# Patient Record
Sex: MALE | Race: OTHER | HISPANIC OR LATINO | Employment: FULL TIME | URBAN - METROPOLITAN AREA
[De-identification: names, ages, dates, MRNs, and addresses within clinical notes are randomized per-mention and may not be internally consistent; named-entity substitution may affect disease eponyms.]

---

## 2018-07-21 ENCOUNTER — HOSPITAL ENCOUNTER (EMERGENCY)
Facility: HOSPITAL | Age: 28
Discharge: HOME | End: 2018-07-21
Attending: EMERGENCY MEDICINE
Payer: COMMERCIAL

## 2018-07-21 VITALS
RESPIRATION RATE: 16 BRPM | SYSTOLIC BLOOD PRESSURE: 156 MMHG | DIASTOLIC BLOOD PRESSURE: 77 MMHG | OXYGEN SATURATION: 96 % | TEMPERATURE: 98.7 F | HEART RATE: 104 BPM | WEIGHT: 250 LBS

## 2018-07-21 DIAGNOSIS — K08.89 PAIN, DENTAL: Primary | ICD-10-CM

## 2018-07-21 PROCEDURE — 99282 EMERGENCY DEPT VISIT SF MDM: CPT

## 2018-07-21 RX ORDER — HYDROCODONE BITARTRATE AND ACETAMINOPHEN 5; 325 MG/1; MG/1
1 TABLET ORAL EVERY 6 HOURS PRN
Qty: 10 TABLET | Refills: 0 | Status: SHIPPED | OUTPATIENT
Start: 2018-07-21 | End: 2018-09-04

## 2018-07-21 NOTE — DISCHARGE INSTRUCTIONS
Toothache   WHAT YOU NEED TO KNOW:   A toothache is pain that is caused by irritation of the nerves in the center of your tooth  The irritation may be caused by several problems, such as a cavity, an infection, a cracked tooth, or gum disease  It is very important to follow up with your dentist so the cause of your toothache can be diagnosed and treated  This can help prevent more serious problems  DISCHARGE INSTRUCTIONS:   Medicines: You may  need any of the following:  · NSAIDs  decrease swelling and pain  This medicine can be bought with or without a doctor's order  This medicine can cause stomach bleeding or kidney problems in certain people  If you take blood thinner medicine, always ask your healthcare provider if NSAIDs are safe for you  Always read the medicine label and follow the directions on it before using this medicine  · Acetaminophen  decreases pain  It is available without a doctor's order  Ask how much to take and how often to take it  Follow directions  Acetaminophen can cause liver damage if not taken correctly  · Pain medicine  may be given as a pill or as medicine that you put directly on your tooth or gums  Do not wait until the pain is severe before you take this medicine  · Antibiotics  help fight or prevent an infection caused by bacteria  Take them as directed  · Take your medicine as directed  Contact your healthcare provider if you think your medicine is not helping or if you have side effects  Tell him of her if you are allergic to any medicine  Keep a list of the medicines, vitamins, and herbs you take  Include the amounts, and when and why you take them  Bring the list or the pill bottles to follow-up visits  Carry your medicine list with you in case of an emergency  Follow up with your dentist as directed: You may be referred to a dental surgeon  Write down your questions so you remember to ask them during your visits     Self-care:   · Rinse your mouth with warm salt water 4 times a day or as directed  · You may need to eat soft foods to help relieve pain caused by chewing  Contact your dentist if:   · You have questions or concerns about your condition or care  Return to the emergency department if:   · You have trouble breathing  · You have swelling in your face or neck  · You have a fever and chills  · You have trouble speaking or swallowing  · You have trouble opening or closing your mouth  © 2017 [facility] [facility] Information is for End User's use only and may not be sold, redistributed or otherwise used for commercial purposes  All illustrations and images included in CareNotes® are the copyrighted property of [employer] D [employer] M [employer]  or Shaan Pleitez  The above information is an  only  It is not intended as medical advice for individual conditions or treatments  Talk to your doctor, nurse or pharmacist before following any medical regimen to see if it is safe and effective for you  DISCHARGE INSTRUCTIONS:    FOLLOW UP WITH YOUR PRIMARY CARE PROVIDER OR THE [address] [place] [address]  MAKE AN APPOINTMENT TO BE SEEN  TAKE TYLENOL OR MOTRIN FOR MILD PAIN  TAKE NORCO AS PRESCRIBED FOR MODERATE PAIN  IF RASH, SHORTNESS OF BREATH OR TROUBLE SWALLOWING, STOP TAKING THE MEDICATION AND BE SEEN  NO DRINKING, DRIVING OR OPERATING HEAVY MACHINERY WHILE TAKING THIS MEDICATION  TAKE CLINDAMYCIN AS PREVIOUSLY PRESCRIBED  IF RASH, SHORTNESS OF BREATH OR TROUBLE SWALLOWING, STOP TAKING THE MEDICATION AND BE SEEN  FOLLOW UP WITH YOUR DENTIST  IF SYMPTOMS WORSEN OR NEW SYMPTOMS ARISE, RETURN TO THE ER TO BE SEEN

## 2018-07-21 NOTE — ED NOTES
Pt complains of L back (wisdom) tooth pain  Pt states he had the same problem 7 years ago  Pt states something he ate last night caused the pain in the tooth         [facility]  07/21/18 0907

## 2018-07-21 NOTE — ED PROVIDER NOTES
History  Chief Complaint   Patient presents with    Dental Pain     Patient reports that he was eating yestrday and felt like his L lower wisdom tooth broke  Patient c/o incresed pain  No fevers     28y  o male with no significant PMH presents to the ER for left lower dental pain for 2 days  Patient states he was eating something when he broke his tooth  He denies taking any medication for pain  He describes his pain as "nerve pain"  He denies radiation of pain  He rates his pain 10/10 and states it is constant  He has an appointment with his dentist tomorrow  He denies fever, chills, rhinorrhea, congestion, sore throat, chest pain, dyspnea, N/V/D, abdominal pain, weakness or paresthesias  History provided by:  Patient   used: No        None       History reviewed  No pertinent past medical history  Past Surgical History:   Procedure Laterality Date    APPENDECTOMY         History reviewed  No pertinent family history  I have reviewed and agree with the history as documented  Social History   Substance Use Topics    Smoking status: Former Smoker    Smokeless tobacco: Never Used      Comment: quit 3 months ago    Alcohol use Yes      Comment: occasional        Review of Systems   Constitutional: Negative for chills and fever  HENT: Positive for dental problem  Negative for congestion, drooling, ear discharge, ear pain, facial swelling, rhinorrhea and sore throat  Eyes: Negative for redness  Respiratory: Negative for shortness of breath  Cardiovascular: Negative for chest pain  Gastrointestinal: Negative for abdominal pain, diarrhea, nausea and vomiting  Musculoskeletal: Negative for neck stiffness  Skin: Negative for rash  Allergic/Immunologic: Negative for food allergies  Neurological: Negative for weakness and numbness  Physical Exam  Physical Exam   Constitutional:  Non-toxic appearance  No distress  HENT:   Head: Normocephalic and atraumatic  Right Ear: Tympanic membrane, external ear and ear canal normal  No drainage, swelling or tenderness  No foreign bodies  Tympanic membrane is not erythematous  No hemotympanum  Left Ear: Tympanic membrane, external ear and ear canal normal  No drainage, swelling or tenderness  No foreign bodies  Tympanic membrane is not erythematous  No hemotympanum  Nose: Nose normal    Mouth/Throat: Uvula is midline, oropharynx is clear and moist and mucous membranes are normal  No oral lesions  No trismus in the jaw  Abnormal dentition  No dental abscesses or uvula swelling  No posterior oropharyngeal edema, posterior oropharyngeal erythema or tonsillar abscesses  No tonsillar exudate  Neck: Normal range of motion and phonation normal  Neck supple  No tracheal deviation present  Cardiovascular: Normal rate, regular rhythm, S1 normal, S2 normal and normal heart sounds  Exam reveals no gallop and no friction rub  No murmur heard  Pulmonary/Chest: Effort normal and breath sounds normal  No respiratory distress  He has no decreased breath sounds  He has no wheezes  He has no rhonchi  He has no rales  He exhibits no tenderness  Neurological: He is alert  GCS eye subscore is 4  GCS verbal subscore is 5  GCS motor subscore is 6  Skin: Skin is warm and dry  No rash noted  Psychiatric: He has a normal mood and affect  Nursing note and vitals reviewed        Vital Signs  ED Triage Vitals [07/21/18 0924]   Temperature Pulse Respirations Blood Pressure SpO2   98 7 °F (37 1 °C) 104 16 156/77 96 %      Temp Source Heart Rate Source Patient Position - Orthostatic VS BP Location FiO2 (%)   Oral Monitor Sitting Right arm --      Pain Score       Worst Possible Pain           Vitals:    07/21/18 0924   BP: 156/77   Pulse: 104   Patient Position - Orthostatic VS: Sitting       Visual Acuity      ED Medications  Medications - No data to display    Diagnostic Studies  Results Reviewed     None                 No orders to display              Procedures  Procedures       Phone Contacts  ED Phone Contact    ED Course                               MDM  Number of Diagnoses or Management Options  Pain, dental: new and does not require workup  Diagnosis management comments: DDX consists of but not limited to: dental pain, dental abscess, dental caries, dental fracture    Patient was seen at [address] [place] [address] [address] today and was given a dental block, Clindamycin and Motrin  [place] drug database searched  No findings  At discharge, I instructed the patient to:  -follow up with pcp  -take Tylenol or Motrin for mild pain  -take Norco as prescribed for moderate pain  -take Clindamycin as previously prescribed  -follow up with your dentist  -return to the ER if symptoms worsened or new symptoms arose  Patient agreed to this plan and was stable at time of discharge  Amount and/or Complexity of Data Reviewed  Review and summarize past medical records: yes    Patient Progress  Patient progress: stable    CritCare Time    Disposition  Final diagnoses:   Pain, dental     Time reflects when diagnosis was documented in both MDM as applicable and the Disposition within this note     Time User Action Codes Description Comment    7/21/2018  9:48 AM Leopoldo Melvin [K08 89] Pain, dental       ED Disposition     ED Disposition Condition Comment    Discharge  [facility] to home/self care      Condition at discharge: Stable        Follow-up Information     Follow up With Specialties Details Why Navid  Schedule an appointment as soon as possible for a visit in 1 day  [address] [place] [address]  [phone]          Discharge Medication List as of 7/21/2018  9:50 AM      START taking these medications    Details   HYDROcodone-acetaminophen (NORCO) 5-325 mg per tablet Take 1 tablet by mouth every 6 (six) hours as needed for pain Max Daily Amount: 4 tablets, Starting Sat 7/21/2018, Print           No discharge procedures on file      ED Provider  Electronically Signed by           Marianela Mariano PA-C  07/21/18 1107

## 2018-09-04 ENCOUNTER — HOSPITAL ENCOUNTER (EMERGENCY)
Facility: HOSPITAL | Age: 28
Discharge: HOME | End: 2018-09-05
Attending: EMERGENCY MEDICINE | Admitting: EMERGENCY MEDICINE
Payer: COMMERCIAL

## 2018-09-04 DIAGNOSIS — E86.0 MILD DEHYDRATION: ICD-10-CM

## 2018-09-04 DIAGNOSIS — R10.9 ABDOMINAL CRAMPING: ICD-10-CM

## 2018-09-04 DIAGNOSIS — R19.7 DIARRHEA: Primary | ICD-10-CM

## 2018-09-04 LAB
ALBUMIN SERPL BCP-MCNC: 3.9 G/DL (ref 3.5–5)
ALP SERPL-CCNC: 80 U/L (ref 46–116)
ALT SERPL W P-5'-P-CCNC: 35 U/L (ref 12–78)
ANION GAP SERPL CALCULATED.3IONS-SCNC: 9 MMOL/L (ref 4–13)
AST SERPL W P-5'-P-CCNC: 21 U/L (ref 5–45)
BASOPHILS # BLD AUTO: 0.05 THOUSANDS/ΜL (ref 0–0.1)
BASOPHILS NFR BLD AUTO: 1 % (ref 0–1)
BILIRUB SERPL-MCNC: 1.63 MG/DL (ref 0.2–1)
BUN SERPL-MCNC: 11 MG/DL (ref 5–25)
CALCIUM SERPL-MCNC: 9.1 MG/DL (ref 8.3–10.1)
CHLORIDE SERPL-SCNC: 104 MMOL/L (ref 100–108)
CO2 SERPL-SCNC: 28 MMOL/L (ref 21–32)
CREAT SERPL-MCNC: 0.92 MG/DL (ref 0.6–1.3)
EOSINOPHIL # BLD AUTO: 0.15 THOUSAND/ΜL (ref 0–0.61)
EOSINOPHIL NFR BLD AUTO: 2 % (ref 0–6)
ERYTHROCYTE [DISTWIDTH] IN BLOOD BY AUTOMATED COUNT: 13.4 % (ref 11.6–15.1)
GFR SERPL CREATININE-BSD FRML MDRD: 114 ML/MIN/1.73SQ M
GLUCOSE SERPL-MCNC: 97 MG/DL (ref 65–140)
HCT VFR BLD AUTO: 45.6 % (ref 36.5–49.3)
HGB BLD-MCNC: 15.1 G/DL (ref 12–17)
IMM GRANULOCYTES # BLD AUTO: 0.08 THOUSAND/UL (ref 0–0.2)
IMM GRANULOCYTES NFR BLD AUTO: 1 % (ref 0–2)
LACTATE SERPL-SCNC: 1.5 MMOL/L (ref 0.5–2)
LIPASE SERPL-CCNC: 115 U/L (ref 73–393)
LYMPHOCYTES # BLD AUTO: 2.68 THOUSANDS/ΜL (ref 0.6–4.47)
LYMPHOCYTES NFR BLD AUTO: 31 % (ref 14–44)
MCH RBC QN AUTO: 31.9 PG (ref 26.8–34.3)
MCHC RBC AUTO-ENTMCNC: 33.1 G/DL (ref 31.4–37.4)
MCV RBC AUTO: 96 FL (ref 82–98)
MONOCYTES # BLD AUTO: 0.76 THOUSAND/ΜL (ref 0.17–1.22)
MONOCYTES NFR BLD AUTO: 9 % (ref 4–12)
NEUTROPHILS # BLD AUTO: 5.07 THOUSANDS/ΜL (ref 1.85–7.62)
NEUTS SEG NFR BLD AUTO: 56 % (ref 43–75)
NRBC BLD AUTO-RTO: 0 /100 WBCS
PLATELET # BLD AUTO: 341 THOUSANDS/UL (ref 149–390)
PMV BLD AUTO: 9.9 FL (ref 8.9–12.7)
POTASSIUM SERPL-SCNC: 3.9 MMOL/L (ref 3.5–5.3)
PROT SERPL-MCNC: 7.8 G/DL (ref 6.4–8.2)
RBC # BLD AUTO: 4.74 MILLION/UL (ref 3.88–5.62)
SODIUM SERPL-SCNC: 141 MMOL/L (ref 136–145)
WBC # BLD AUTO: 8.79 THOUSAND/UL (ref 4.31–10.16)

## 2018-09-04 PROCEDURE — 87505 NFCT AGENT DETECTION GI: CPT | Performed by: EMERGENCY MEDICINE

## 2018-09-04 PROCEDURE — 83690 ASSAY OF LIPASE: CPT | Performed by: EMERGENCY MEDICINE

## 2018-09-04 PROCEDURE — 36415 COLL VENOUS BLD VENIPUNCTURE: CPT | Performed by: EMERGENCY MEDICINE

## 2018-09-04 PROCEDURE — 83605 ASSAY OF LACTIC ACID: CPT | Performed by: EMERGENCY MEDICINE

## 2018-09-04 PROCEDURE — 85025 COMPLETE CBC W/AUTO DIFF WBC: CPT | Performed by: EMERGENCY MEDICINE

## 2018-09-04 PROCEDURE — 96360 HYDRATION IV INFUSION INIT: CPT

## 2018-09-04 PROCEDURE — 80053 COMPREHEN METABOLIC PANEL: CPT | Performed by: EMERGENCY MEDICINE

## 2018-09-04 RX ADMIN — SODIUM CHLORIDE 1000 ML: 0.9 INJECTION, SOLUTION INTRAVENOUS at 23:17

## 2018-09-05 ENCOUNTER — TELEPHONE (OUTPATIENT)
Dept: EMERGENCY DEPT | Facility: HOSPITAL | Age: 28
End: 2018-09-05

## 2018-09-05 VITALS
OXYGEN SATURATION: 98 % | RESPIRATION RATE: 18 BRPM | WEIGHT: 220 LBS | HEART RATE: 94 BPM | SYSTOLIC BLOOD PRESSURE: 145 MMHG | DIASTOLIC BLOOD PRESSURE: 87 MMHG | TEMPERATURE: 98 F

## 2018-09-05 DIAGNOSIS — A04.72 C. DIFFICILE COLITIS: Primary | ICD-10-CM

## 2018-09-05 LAB
BILIRUB UR QL STRIP: NEGATIVE
C DIFF TOX GENS STL QL NAA+PROBE: ABNORMAL
CAMPYLOBACTER DNA SPEC NAA+PROBE: NORMAL
CLARITY UR: CLEAR
COLOR UR: YELLOW
COLOR, POC: YELLOW
GLUCOSE UR STRIP-MCNC: NEGATIVE MG/DL
HGB UR QL STRIP.AUTO: NEGATIVE
KETONES UR STRIP-MCNC: ABNORMAL MG/DL
LEUKOCYTE ESTERASE UR QL STRIP: NEGATIVE
NITRITE UR QL STRIP: NEGATIVE
PH UR STRIP.AUTO: 7 [PH] (ref 4.5–8)
PROT UR STRIP-MCNC: NEGATIVE MG/DL
SALMONELLA DNA SPEC QL NAA+PROBE: NORMAL
SHIGA TOXIN STX GENE SPEC NAA+PROBE: NORMAL
SHIGELLA DNA SPEC QL NAA+PROBE: NORMAL
SP GR UR STRIP.AUTO: 1.02 (ref 1–1.03)
UROBILINOGEN UR QL STRIP.AUTO: 1 E.U./DL

## 2018-09-05 PROCEDURE — 81003 URINALYSIS AUTO W/O SCOPE: CPT

## 2018-09-05 PROCEDURE — 87493 C DIFF AMPLIFIED PROBE: CPT | Performed by: EMERGENCY MEDICINE

## 2018-09-05 PROCEDURE — 99284 EMERGENCY DEPT VISIT MOD MDM: CPT

## 2018-09-05 RX ORDER — VANCOMYCIN HYDROCHLORIDE 125 MG/1
125 CAPSULE ORAL 4 TIMES DAILY
Qty: 56 CAPSULE | Refills: 0 | Status: SHIPPED | OUTPATIENT
Start: 2018-09-05 | End: 2018-09-19

## 2018-09-05 RX ORDER — VANCOMYCIN HYDROCHLORIDE 125 MG/1
125 CAPSULE ORAL 4 TIMES DAILY
Qty: 56 CAPSULE | Refills: 0 | Status: SHIPPED | OUTPATIENT
Start: 2018-09-05 | End: 2018-09-05

## 2018-09-05 RX ORDER — VANCOMYCIN HYDROCHLORIDE 125 MG/1
125 CAPSULE ORAL 4 TIMES DAILY
Qty: 56 CAPSULE | Refills: 0 | Status: SHIPPED | OUTPATIENT
Start: 2018-09-05 | End: 2018-09-05 | Stop reason: ALTCHOICE

## 2018-09-05 NOTE — PROGRESS NOTES
Called patient about result  Returned call quickly  Educated about hand hygience and wiping down bathroom with bleach wipes after every use  Will call in Vancomycin and have patient follow up with PCP/GI   See telephone encounter for vanco order

## 2018-09-05 NOTE — TELEPHONE ENCOUNTER
Patient called with positive result of C diff in stool culture  Discussed with ID who recommended starting Vancomycin 125mg QID x 14 days  Educated on hand hygiene as well as cleaning bathroom with bleach after every use

## 2018-09-05 NOTE — DISCHARGE INSTRUCTIONS
Blunt Abdominal Injury   WHAT YOU NEED TO KNOW:   A blunt abdominal injury is a direct blow to the abdomen without an open wound  These injuries are caused by car accidents, sports injuries, or a fall  Organs such as your pancreas, liver, spleen, or bladder may be injured  Your intestines may also be injured  These injuries may cause internal bleeding  DISCHARGE INSTRUCTIONS:   Call 911 for any of the following:   · You feel weak, lightheaded, or you faint  · You have a fast heartbeat, fast breathing, and pale, sweaty skin  · You have new or severe pain, swelling, or firmness in your abdomen  Seek care immediately if:   · You have nausea and are vomiting  · You have blood in your urine or bowel movement  Contact your healthcare provider if:   · You have questions or concerns about your condition or care  Medicines:   · NSAIDs  help decrease swelling and pain or fever  This medicine is available with or without a doctor's order  NSAIDs can cause stomach bleeding or kidney problems in certain people  If you take blood thinner medicine, always ask your healthcare provider if NSAIDs are safe for you  Always read the medicine label and follow directions  · Take your medicine as directed  Contact your healthcare provider if you think your medicine is not helping or if you have side effects  Tell him or her if you are allergic to any medicine  Keep a list of the medicines, vitamins, and herbs you take  Include the amounts, and when and why you take them  Bring the list or the pill bottles to follow-up visits  Carry your medicine list with you in case of an emergency  Apply ice:  Ice helps to decrease swelling and pain  Ice may also help prevent tissue damage  Use an ice pack, or put crushed ice in a plastic bag  Cover it with a towel and place it on your injured area for 15 to 20 minutes every hour or as directed    Limit activity as directed: Decrease pain, swelling, and prevent other injuries by limiting activity  Do not play sports or exercise until your healthcare provider says it is okay  Follow up with your healthcare provider as directed:  Write down your questions so you remember to ask them during your visits  © 2017 [employer] [employer] Information is for End User's use only and may not be sold, redistributed or otherwise used for commercial purposes  All illustrations and images included in CareNotes® are the copyrighted property of A D A M , Inc  or Shaan [employer]ite[employer]  The above information is an  only  It is not intended as medical advice for individual conditions or treatments  Talk to your doctor, nurse or pharmacist before following any medical regimen to see if it is safe and effective for you  Dehydration   WHAT YOU NEED TO KNOW:   Dehydration is a condition that develops when your body does not have enough fluid  You may become dehydrated if you do not drink enough water or lose too much fluid  Fluid loss may also cause loss of electrolytes (minerals), such as sodium  DISCHARGE INSTRUCTIONS:   Seek care immediately if:   · You have a seizure  · You are confused or cannot think clearly  · You are extremely sleepy, or another person cannot wake you  · You become dizzy or faint when you stand  · You are not able to urinate  · You have trouble breathing  · You have a fast or irregular heartbeat  · Your hands or feet are cold, or your face is pale  Contact your healthcare provider if:   · You have trouble drinking liquids because you are vomiting  · Your symptoms get worse  · You have a fever  · You feel very weak or tired  · You have questions or concerns about your condition or care  Follow up with your healthcare provider as directed:  Write down your questions so you remember to ask them during your visits  Prevent or manage dehydration:   · Drink liquids as directed    Liquids that contain water, sugar, and minerals can help your body hold in fluid and help prevent dehydration  Drink liquids throughout the day, not just when you feel thirsty  Men should drink about 3 liters (13 eight-ounce cups) of liquid each day  Women should drink about 2 liters (9 eight-ounce cups) of liquid each day  Drink even more liquid if you will be outdoors, in the sun for a long time, or exercising  · Stay cool  Limit the time you spend outdoors during the hottest part of the day  Dress in lightweight clothes  · Keep track of how often you urinate  If you urinate less than usual or your urine is darker, drink more liquids  © 2017 [address] [employer]  Information is for End User's use only and may not be sold, redistributed or otherwise used for commercial purposes  All illustrations and images included in CareNotes® are the copyrighted property of [employer]  or Shaan [employer]itez  The above information is an  only  It is not intended as medical advice for individual conditions or treatments  Talk to your doctor, nurse or pharmacist before following any medical regimen to see if it is safe and effective for you

## 2018-09-05 NOTE — ED PROVIDER NOTES
History  Chief Complaint   Patient presents with    Diarrhea     patient reports diarrhea for 2 weeks s/p antibiotic therapy  35 yo male who ended up with ludwigs angina and needed a dental procedure and was on 10 days of clindamycin followed by 10 days of amoxicillin and since that time has had 2 weeks of diarrhea  Pt states "on a good day it will only be like 4 or 5 times, on a bday day way over 10 times"  Pt states its foamy and foul smelling  Came in due to concern for cdiff  History provided by:  Patient   used: No    Diarrhea   Quality:  Explosive (foamy)  Severity:  Moderate  Onset quality:  Gradual  Duration:  2 weeks  Timing:  Constant  Progression:  Unchanged  Relieved by:  Nothing  Exacerbated by: eating or drinking  Ineffective treatments:  None tried  Associated symptoms: abdominal pain (cramping just before and during BM)    Associated symptoms: no chills, no fever, no headaches and no vomiting    Risk factors: recent antibiotic use        None       History reviewed  No pertinent past medical history  Past Surgical History:   Procedure Laterality Date    APPENDECTOMY         History reviewed  No pertinent family history  I have reviewed and agree with the history as documented  Social History   Substance Use Topics    Smoking status: Former Smoker    Smokeless tobacco: Never Used      Comment: quit 3 months ago    Alcohol use Yes      Comment: occasional        Review of Systems   Constitutional: Negative for chills and fever  HENT: Negative for congestion and sore throat  Eyes: Negative for visual disturbance  Respiratory: Negative for shortness of breath and wheezing  Cardiovascular: Negative for chest pain and palpitations  Gastrointestinal: Positive for abdominal pain (cramping just before and during BM) and diarrhea  Negative for nausea and vomiting  Genitourinary: Negative for dysuria     Musculoskeletal: Negative for neck pain and neck stiffness  Skin: Negative for pallor and rash  Neurological: Negative for headaches  Psychiatric/Behavioral: Negative for confusion  All other systems reviewed and are negative  Physical Exam  Physical Exam   Constitutional: He is oriented to person, place, and time  He appears well-developed and well-nourished  No distress  HENT:   Head: Normocephalic and atraumatic  MM tachy   Eyes: EOM are normal  Pupils are equal, round, and reactive to light  Neck: Normal range of motion  Neck supple  Cardiovascular: Regular rhythm  Tachycardia present  No murmur heard  Pulmonary/Chest: Effort normal and breath sounds normal    Abdominal: Soft  Bowel sounds are normal  He exhibits no distension  There is no tenderness  Musculoskeletal: Normal range of motion  He exhibits no edema  Neurological: He is alert and oriented to person, place, and time  Skin: Skin is warm  Capillary refill takes less than 2 seconds  No rash noted  No pallor  Psychiatric: He has a normal mood and affect  His behavior is normal    Nursing note and vitals reviewed        Vital Signs  ED Triage Vitals   Temperature Pulse Respirations Blood Pressure SpO2   09/04/18 2227 09/04/18 2227 09/04/18 2227 09/04/18 2227 09/04/18 2227   98 °F (36 7 °C) 101 16 159/98 98 %      Temp Source Heart Rate Source Patient Position - Orthostatic VS BP Location FiO2 (%)   09/04/18 2227 09/05/18 0014 09/04/18 2227 09/04/18 2227 --   Oral Monitor Sitting Right arm       Pain Score       09/05/18 0014       3           Vitals:    09/04/18 2227 09/05/18 0014   BP: 159/98 145/87   Pulse: 101 94   Patient Position - Orthostatic VS: Sitting Sitting       Visual Acuity      ED Medications  Medications   sodium chloride 0 9 % bolus 1,000 mL (0 mL Intravenous Stopped 9/5/18 0011)       Diagnostic Studies  Results Reviewed     Procedure Component Value Units Date/Time    Clostridium difficile toxin by PCR [[id number]] Collected:  09/05/18 0023    Lab Status:  No result Specimen:  Stool from Rectum     POCT urinalysis dipstick [[id number]]  (Normal) Resulted:  09/05/18 0020    Lab Status:  Final result Specimen:  Urine Updated:  09/05/18 0020     Color, UA yellow    ED Urine Macroscopic [[id number]]  (Abnormal) Collected:  09/05/18 0028    Lab Status:  Final result Specimen:  Urine Updated:  09/05/18 0019     Color, UA Yellow     Clarity, UA Clear     pH, UA 7 0     Leukocytes, UA Negative     Nitrite, UA Negative     Protein, UA Negative mg/dl      Glucose, UA Negative mg/dl      Ketones, UA Trace (A) mg/dl      Urobilinogen, UA 1 0 E U /dl      Bilirubin, UA Negative     Blood, UA Negative     Specific Gravity, UA 1 020    Narrative:       CLINITEK RESULT    Lactic acid, plasma [[id number]]  (Normal) Collected:  09/04/18 2317    Lab Status:  Final result Specimen:  Blood from Arm, Left Updated:  09/04/18 2341     LACTIC ACID 1 5 mmol/L     Narrative:         Result may be elevated if tourniquet was used during collection  Comprehensive metabolic panel [[id number]]  (Abnormal) Collected:  09/04/18 2317    Lab Status:  Final result Specimen:  Blood from Arm, Left Updated:  09/04/18 2338     Sodium 141 mmol/L      Potassium 3 9 mmol/L      Chloride 104 mmol/L      CO2 28 mmol/L      ANION GAP 9 mmol/L      BUN 11 mg/dL      Creatinine 0 92 mg/dL      Glucose 97 mg/dL      Calcium 9 1 mg/dL      AST 21 U/L      ALT 35 U/L      Alkaline Phosphatase 80 U/L      Total Protein 7 8 g/dL      Albumin 3 9 g/dL      Total Bilirubin 1 63 (H) mg/dL      eGFR 114 ml/min/1 73sq m     Narrative:         National Kidney Disease Education Program recommendations are as follows:  GFR calculation is accurate only with a steady state creatinine  Chronic Kidney disease less than 60 ml/min/1 73 sq  meters  Kidney failure less than 15 ml/min/1 73 sq  meters      Lipase [[id number]]  (Normal) Collected:  09/04/18 2317    Lab Status:  Final result Specimen:  Blood from Arm, Left Updated: 09/04/18 2338     Lipase 115 u/L     Stool Enteric Bacterial Panel by PCR [[id number]] Collected:  09/04/18 2321    Lab Status: In process Specimen:  Stool from Rectum Updated:  09/04/18 2323    CBC and differential [[id number]] Collected:  09/04/18 2317    Lab Status:  Final result Specimen:  Blood from Arm, Left Updated:  09/04/18 2323     WBC 8 79 Thousand/uL      RBC 4 74 Million/uL      Hemoglobin 15 1 g/dL      Hematocrit 45 6 %      MCV 96 fL      MCH 31 9 pg      MCHC 33 1 g/dL      RDW 13 4 %      MPV 9 9 fL      Platelets 762 Thousands/uL      nRBC 0 /100 WBCs      Neutrophils Relative 56 %      Immat GRANS % 1 %      Lymphocytes Relative 31 %      Monocytes Relative 9 %      Eosinophils Relative 2 %      Basophils Relative 1 %      Neutrophils Absolute 5 07 Thousands/µL      Immature Grans Absolute 0 08 Thousand/uL      Lymphocytes Absolute 2 68 Thousands/µL      Monocytes Absolute 0 76 Thousand/µL      Eosinophils Absolute 0 15 Thousand/µL      Basophils Absolute 0 05 Thousands/µL     Clostridium difficile toxin by PCR [[id number]] Collected:  09/04/18 2321    Lab Status:  No result Specimen:  Stool from Rectum                  No orders to display              Procedures  Procedures       Phone Contacts  ED Phone Contact    ED Course  ED Course as of Sep 05 0047   Tue Sep 04, 2018   2241 Pt seen and examined  Healthy 35 yo male who ended up with ludwigs angina and needed a dental procedure and was on 10 days of clindamycin followed by 10 days of amoxicillin and since that time has had 2 weeks of diarrhea  Pt states "on a good day it will only be like 4 or 5 times, on a bday day way over 10 times"  Pt states its foamy and foul smelling  Came in due to concern for cdiff  Will give IVF, check labs, urine and send stool for cdiff and other cx      2326 WBC 8 79  Stool sent for cdiff and stool cx  Wed Sep 05, 2018   0000 Pt resting comfortably, aware lab needs more stool for cdiff testing  IVF infusing  Plan to d/c home after IVF finished  Lengthy discussion with pt that labs including WBC nml and offered CT a/p IV&po but pt would rather f/u outpt with GI if stool cx neg for cdiff or other bacterial source  0014 Pt was able to give cdiff sample  Will d/c home  MDM  CritCare Time    Disposition  Final diagnoses:   Diarrhea   Mild dehydration   Abdominal cramping     Time reflects when diagnosis was documented in both MDM as applicable and the Disposition within this note     Time User Action Codes Description Comment    9/5/2018 12:03 AM [place] Covert Add [R19 7] Diarrhea     9/5/2018 12:03 AM [place] Presume M Add [E86 0] Mild dehydration     9/5/2018 12:03 AM [place] Covert Add [R10 9] Abdominal cramping       ED Disposition     ED Disposition Condition Comment    Discharge  Nick Prasad discharge to home/self care  Condition at discharge: Good        Follow-up Information     Follow up With Specialties Details Why Contact Info    Bennie Wang MD Family Medicine Call  [address] [place] [phone]  [facility]  2  [facility]enterology Schedule an appointment as soon as possible for a visit for colonscopy if symptoms persist [facility] [id number] [phone]          There are no discharge medications for this patient  No discharge procedures on file      ED Provider  Electronically Signed by           Alice Gautam DO  09/05/18 1600 [facility],   09/05/18 4176

## 2018-09-07 ENCOUNTER — HOSPITAL ENCOUNTER (EMERGENCY)
Facility: HOSPITAL | Age: 28
Discharge: HOME | End: 2018-09-07
Attending: EMERGENCY MEDICINE | Admitting: EMERGENCY MEDICINE
Payer: COMMERCIAL

## 2018-09-07 VITALS
RESPIRATION RATE: 20 BRPM | TEMPERATURE: 98.7 F | HEART RATE: 103 BPM | SYSTOLIC BLOOD PRESSURE: 153 MMHG | OXYGEN SATURATION: 97 % | DIASTOLIC BLOOD PRESSURE: 87 MMHG

## 2018-09-07 DIAGNOSIS — A04.72 C. DIFFICILE COLITIS: Primary | ICD-10-CM

## 2018-09-07 PROCEDURE — 99283 EMERGENCY DEPT VISIT LOW MDM: CPT

## 2018-09-08 NOTE — DISCHARGE INSTRUCTIONS
Clostridium Difficile Infection   WHAT YOU NEED TO KNOW:   Clostridium difficile infection, or C  difficile, is an infection in your colon caused by bacteria  Different types of bacteria live inside the colon, creating a healthy balance between good and bad bacteria  If the C  difficile bacteria grow rapidly, this can disrupt the healthy balance of the colon  This can cause the lining of the colon to swell, which leads to an infection  DISCHARGE INSTRUCTIONS:   Medicines:   · Antibiotics: This medicine is given to keep the C  difficile bacteria from growing and allow the normal bacteria in your intestines to grow  Always take your antibiotics exactly as ordered by your healthcare provider  Do not stop taking your medicine unless directed by your healthcare provider  Never save antibiotics or take leftover antibiotics that were given to you for another illness  · Take your medicine as directed  Contact your healthcare provider if you think your medicine is not helping or if you have side effects  Tell him or her if you are allergic to any medicine  Keep a list of the medicines, vitamins, and herbs you take  Include the amounts, and when and why you take them  Bring the list or the pill bottles to follow-up visits  Carry your medicine list with you in case of an emergency  Follow up with your healthcare provider within 1 to 2 days:  Write down your questions so you remember to ask them during your visits  Self care:   · Drink liquids to prevent dehydration:  Ask how much liquid you should drink to prevent dehydration caused by diarrhea  Most adults should drink between 9 and 13 eight-ounce cups of liquid every day  For most people, good liquids to drink are water, juice, and broth  · Wash your hands:  Wash your hands often with germ-killing soap and warm, running water  Alcohol-based hand rubs do not kill C  difficile bacteria   Always wash your hands well after you use the toilet, diaper a child, and before you prepare or serve food  Tell anyone who touches you to wear gloves and wash their hands  · Clean surfaces with bleach:  Clean tabletops, desks, and other surfaces before anyone else touches or uses them  Clean with chlorine-based disinfectants, such as household bleach  · Avoid the spread of C  difficile:  Do not share any items with other people  Use as many disposable items, such as paper plates, as you can  Do this until your diarrhea has gone away  Contact your healthcare provider if:   · You have a fever  · Your signs and symptoms do not go away, or they come back, even after treatment  · You have questions or concerns about your condition or care  Return to the emergency department if:   · Your diarrhea and stomach cramps get worse  · Your abdomen is hard or feels swollen  · You have black or bright red stools  · You vomit blood  · You cannot eat or drink  · You are short of breath, or feel like you are going to faint  · You have 1 or more of the following signs of dehydration:     ¨ Dizziness or weakness, or extreme sleepiness    ¨ Dry mouth, cracked lips, or you feel very thirsty    ¨ Fast heartbeat or rapid breathing    ¨ More sleepy than usual    ¨ Very little urine or no urine    ¨ Sunken eyes     ¨ A child may be more irritable or fussy than usual  The soft spot on a baby's head may look sunken in   © 2017 [address] is for End User's use only and may not be sold, redistributed or otherwise used for commercial purposes  All illustrations and images included in CareNotes® are the copyrighted property of A D [employer] M [employer]  or Shaan Pleitez  The above information is an  only  It is not intended as medical advice for individual conditions or treatments  Talk to your doctor, nurse or pharmacist before following any medical regimen to see if it is safe and effective for you

## 2019-02-15 ENCOUNTER — TELEPHONE (OUTPATIENT)
Dept: GASTROENTEROLOGY | Facility: AMBULARY SURGERY CENTER | Age: 29
End: 2019-02-15

## 2019-02-15 NOTE — TELEPHONE ENCOUNTER
NEW PT    Pt called requesting an earlier appt for possible cdiff/ colitis at the [place] location  Pt stated he needs an appt before the end of the Month for legal reasons

## 2019-04-02 ENCOUNTER — HOSPITAL ENCOUNTER (EMERGENCY)
Facility: HOSPITAL | Age: 29
Discharge: HOME | End: 2019-04-02
Attending: EMERGENCY MEDICINE | Admitting: EMERGENCY MEDICINE
Payer: COMMERCIAL

## 2019-04-02 VITALS
OXYGEN SATURATION: 97 % | WEIGHT: 315 LBS | TEMPERATURE: 98.9 F | DIASTOLIC BLOOD PRESSURE: 95 MMHG | HEART RATE: 104 BPM | RESPIRATION RATE: 18 BRPM | SYSTOLIC BLOOD PRESSURE: 183 MMHG

## 2019-04-02 DIAGNOSIS — S61.215A LACERATION OF LEFT RING FINGER WITHOUT FOREIGN BODY WITHOUT DAMAGE TO NAIL, INITIAL ENCOUNTER: Primary | ICD-10-CM

## 2019-04-02 PROCEDURE — 12001 RPR S/N/AX/GEN/TRNK 2.5CM/<: CPT | Performed by: PHYSICIAN ASSISTANT

## 2019-04-02 PROCEDURE — 99283 EMERGENCY DEPT VISIT LOW MDM: CPT | Performed by: PHYSICIAN ASSISTANT

## 2019-04-02 PROCEDURE — 99282 EMERGENCY DEPT VISIT SF MDM: CPT

## 2019-04-02 RX ORDER — LIDOCAINE HYDROCHLORIDE 10 MG/ML
5 INJECTION, SOLUTION EPIDURAL; INFILTRATION; INTRACAUDAL; PERINEURAL ONCE
Status: COMPLETED | OUTPATIENT
Start: 2019-04-02 | End: 2019-04-02

## 2019-04-02 RX ORDER — NAPROXEN 500 MG/1
500 TABLET ORAL 2 TIMES DAILY WITH MEALS
Qty: 30 TABLET | Refills: 0 | Status: SHIPPED | OUTPATIENT
Start: 2019-04-02

## 2019-04-02 RX ADMIN — LIDOCAINE HYDROCHLORIDE 5 ML: 10 INJECTION, SOLUTION EPIDURAL; INFILTRATION; INTRACAUDAL; PERINEURAL at 15:12
